# Patient Record
Sex: MALE | Race: BLACK OR AFRICAN AMERICAN | Employment: FULL TIME | URBAN - METROPOLITAN AREA
[De-identification: names, ages, dates, MRNs, and addresses within clinical notes are randomized per-mention and may not be internally consistent; named-entity substitution may affect disease eponyms.]

---

## 2018-02-14 ENCOUNTER — HOSPITAL ENCOUNTER (OUTPATIENT)
Dept: LAB | Age: 38
Discharge: HOME | End: 2018-02-14
Payer: COMMERCIAL

## 2018-02-14 ENCOUNTER — OFFICE VISIT (OUTPATIENT)
Dept: INTERNAL MEDICINE CLINIC | Age: 38
End: 2018-02-14

## 2018-02-14 VITALS
WEIGHT: 257 LBS | HEIGHT: 71 IN | DIASTOLIC BLOOD PRESSURE: 94 MMHG | SYSTOLIC BLOOD PRESSURE: 132 MMHG | HEART RATE: 82 BPM | OXYGEN SATURATION: 97 % | TEMPERATURE: 98.1 F | RESPIRATION RATE: 14 BRPM | BODY MASS INDEX: 35.98 KG/M2

## 2018-02-14 DIAGNOSIS — Z00.00 ROUTINE GENERAL MEDICAL EXAMINATION AT A HEALTH CARE FACILITY: ICD-10-CM

## 2018-02-14 DIAGNOSIS — Z00.00 ROUTINE GENERAL MEDICAL EXAMINATION AT A HEALTH CARE FACILITY: Primary | ICD-10-CM

## 2018-02-14 PROBLEM — E66.9 OBESITY, CLASS II, BMI 35-39.9: Status: ACTIVE | Noted: 2018-02-14

## 2018-02-14 LAB
ALBUMIN SERPL-MCNC: 4.3 G/DL (ref 3.4–5)
ALBUMIN/GLOB SERPL: 1.2 {RATIO} (ref 0.8–1.7)
ALP SERPL-CCNC: 93 U/L (ref 45–117)
ALT SERPL-CCNC: 42 U/L (ref 16–61)
ANION GAP SERPL CALC-SCNC: 9 MMOL/L (ref 3–18)
AST SERPL-CCNC: 21 U/L (ref 15–37)
BASOPHILS # BLD: 0 K/UL (ref 0–0.06)
BASOPHILS NFR BLD: 1 % (ref 0–2)
BILIRUB SERPL-MCNC: 0.3 MG/DL (ref 0.2–1)
BUN SERPL-MCNC: 18 MG/DL (ref 7–18)
BUN/CREAT SERPL: 16 (ref 12–20)
CALCIUM SERPL-MCNC: 8.9 MG/DL (ref 8.5–10.1)
CHLORIDE SERPL-SCNC: 105 MMOL/L (ref 100–108)
CHOLEST SERPL-MCNC: 141 MG/DL
CO2 SERPL-SCNC: 26 MMOL/L (ref 21–32)
CREAT SERPL-MCNC: 1.12 MG/DL (ref 0.6–1.3)
DIFFERENTIAL METHOD BLD: ABNORMAL
EOSINOPHIL # BLD: 0.1 K/UL (ref 0–0.4)
EOSINOPHIL NFR BLD: 2 % (ref 0–5)
ERYTHROCYTE [DISTWIDTH] IN BLOOD BY AUTOMATED COUNT: 12.1 % (ref 11.6–14.5)
EST. AVERAGE GLUCOSE BLD GHB EST-MCNC: NORMAL MG/DL
GLOBULIN SER CALC-MCNC: 3.6 G/DL (ref 2–4)
GLUCOSE SERPL-MCNC: 91 MG/DL (ref 74–99)
HBA1C MFR BLD: 4.8 % (ref 4.2–5.6)
HCT VFR BLD AUTO: 40.9 % (ref 36–48)
HDLC SERPL-MCNC: 39 MG/DL (ref 40–60)
HDLC SERPL: 3.6 {RATIO} (ref 0–5)
HGB BLD-MCNC: 13.3 G/DL (ref 13–16)
LDLC SERPL CALC-MCNC: 86.8 MG/DL (ref 0–100)
LIPID PROFILE,FLP: ABNORMAL
LYMPHOCYTES # BLD: 2.4 K/UL (ref 0.9–3.6)
LYMPHOCYTES NFR BLD: 39 % (ref 21–52)
MCH RBC QN AUTO: 29.3 PG (ref 24–34)
MCHC RBC AUTO-ENTMCNC: 32.5 G/DL (ref 31–37)
MCV RBC AUTO: 90.1 FL (ref 74–97)
MONOCYTES # BLD: 0.5 K/UL (ref 0.05–1.2)
MONOCYTES NFR BLD: 8 % (ref 3–10)
NEUTS SEG # BLD: 3 K/UL (ref 1.8–8)
NEUTS SEG NFR BLD: 50 % (ref 40–73)
PLATELET # BLD AUTO: 305 K/UL (ref 135–420)
PMV BLD AUTO: 10.7 FL (ref 9.2–11.8)
POTASSIUM SERPL-SCNC: 4.1 MMOL/L (ref 3.5–5.5)
PROT SERPL-MCNC: 7.9 G/DL (ref 6.4–8.2)
RBC # BLD AUTO: 4.54 M/UL (ref 4.7–5.5)
SODIUM SERPL-SCNC: 140 MMOL/L (ref 136–145)
TRIGL SERPL-MCNC: 76 MG/DL (ref ?–150)
TSH SERPL DL<=0.05 MIU/L-ACNC: 2.27 UIU/ML (ref 0.36–3.74)
VLDLC SERPL CALC-MCNC: 15.2 MG/DL
WBC # BLD AUTO: 6 K/UL (ref 4.6–13.2)

## 2018-02-14 PROCEDURE — 83036 HEMOGLOBIN GLYCOSYLATED A1C: CPT | Performed by: PHYSICIAN ASSISTANT

## 2018-02-14 PROCEDURE — 80053 COMPREHEN METABOLIC PANEL: CPT | Performed by: PHYSICIAN ASSISTANT

## 2018-02-14 PROCEDURE — 36415 COLL VENOUS BLD VENIPUNCTURE: CPT | Performed by: PHYSICIAN ASSISTANT

## 2018-02-14 PROCEDURE — 85025 COMPLETE CBC W/AUTO DIFF WBC: CPT | Performed by: PHYSICIAN ASSISTANT

## 2018-02-14 PROCEDURE — 84443 ASSAY THYROID STIM HORMONE: CPT | Performed by: PHYSICIAN ASSISTANT

## 2018-02-14 PROCEDURE — 80061 LIPID PANEL: CPT | Performed by: PHYSICIAN ASSISTANT

## 2018-02-14 RX ORDER — NAPROXEN 500 MG/1
500 TABLET ORAL 2 TIMES DAILY WITH MEALS
Qty: 30 TAB | Refills: 0 | Status: SHIPPED | OUTPATIENT
Start: 2018-02-14 | End: 2018-09-26 | Stop reason: SDUPTHER

## 2018-02-14 NOTE — PROGRESS NOTES
1. Have you been to the ER, urgent care clinic or hospitalized since your last visit? NO.     2. Have you seen or consulted any other health care providers outside of the [address] [place] [address] since your last visit (Include any pap smears or colon screening)? NO      Do you have an Advanced Directive? NO    Would you like information on Advanced Directives?  NO

## 2018-02-14 NOTE — MR AVS SNAPSHOT
[address] [address] [address] [place], [address] 
[phone] Patient: Germán Welsh MRN: [id number] FRANCIA:[birth year] Visit Information Date & Time Provider Department Dept. Phone Encounter #  
 2/14/2018  1:30 PM Margo Colleen, 49[facility] [phone] Upcoming Health Maintenance Date Due DTaP/Tdap/Td series (1 - Tdap) 9/30/2003 Influenza Age 5 to Adult 8/1/2017 Allergies as of 2/14/2018  Review Complete On: 2/14/2018 By: Antonia Denis LPN No Known Allergies Current Immunizations  Never Reviewed No immunizations on file. Not reviewed this visit You Were Diagnosed With   
  
 Codes Comments Routine general medical examination at a health care facility    -  Primary ICD-10-CM: Z00.00 ICD-9-CM: V70.0 Vitals BP Pulse Temp Resp Height(growth percentile) Weight(growth percentile) (!) 132/94 (BP 1 Location: Right arm, BP Patient Position: Sitting) 82 98.1 °F (36.7 °C) (Oral) 14 5' 11\" (1.803 m) 257 lb (116.6 kg) SpO2 BMI Smoking Status 97% 35.84 kg/m2 Never Smoker Vitals History BMI and BSA Data Body Mass Index Body Surface Area  
 35.84 kg/m 2 2.42 m 2 Preferred Pharmacy Pharmacy Name Phone [facility] [address] [place] [address] [phone] Your Updated Medication List  
  
   
This list is accurate as of: 2/14/18  1:53 PM.  Always use your most recent med list.  
  
  
  
  
 acetaminophen 500 mg tablet Commonly known as:  TYLENOL Take 1 Tab by mouth every six (6) hours as needed for Pain. cyclobenzaprine 10 mg tablet Commonly known as:  FLEXERIL Take 1 Tab by mouth three (3) times daily as needed for Muscle Spasm(s). lisinopril 10 mg tablet Commonly known as:  [facility] Take 1 Tab by mouth daily. naproxen 500 mg tablet Commonly known as:  NAPROSYN Take 1 Tab by mouth two (2) times daily (with meals). Prescriptions Sent to Pharmacy Refills  
 naproxen (NAPROSYN) 500 mg tablet 0 Sig: Take 1 Tab by mouth two (2) times daily (with meals). Class: Normal  
 Pharmacy: [facility] [address] [place] [address] Holli Deleon [address] [place] [address] #: [phone] Route: Oral  
  
Introducing [facility] & HE[facility]! [employer] introduces [employer] patient portal. Now you can access parts of your medical record, email your doctor's office, and request medication refills online. 1. In your internet browser, go to [email or web address] 2. Click on the First Time User? Click Here link in the Sign In box. You will see the New Member Sign Up page. 3. Enter your [employer] Access Code exactly as it appears below. You will not need to use this code after youve completed the sign-up process. If you do not sign up before the expiration date, you must request a new code. · [employer] Access Code: [email or web address] Expires: 5/15/2018  1:53 PM 
 
4. Enter the last four digits of your Social Security Number (xxxx) and Date of Birth (mm/dd/yyyy) as indicated and click Submit. You will be taken to the next sign-up page. 5. Create a [employer] ID. This will be your [employer] login ID and cannot be changed, so think of one that is secure and easy to remember. 6. Create a [employer] password. You can change your password at any time. 7. Enter your Password Reset Question and Answer. This can be used at a later time if you forget your password. 8. Enter your e-mail address. You will receive e-mail notification when new information is available in [address] 9. Click Sign Up. You can now view and download portions of your medical record. 10. Click the Download Summary menu link to download a portable copy of your medical information. If you have questions, please visit the Frequently Asked Questions section of the [employer] website. Remember, [employer] is NOT to be used for urgent needs. For medical emergencies, dial 911. Now available from your iPhone and Android! Please provide this summary of care documentation to your next provider. Your primary care clinician is listed as Jensen Means. If you have any questions after today's visit, please call [phone].

## 2018-02-14 NOTE — PROGRESS NOTES
HPI/History  Oneyda Desai is a 30 y.o.  male. Former pt of Dr. Jose G Rahman and presents to establish with new provider and for acute issue. Pt c/o pain around cervicothoracic junction and lumbosacral region x 2 days. Mostly midline in both areas. Both sharp and intermittent in nature. Some aching of bilat posterior legs but denies any radiation into arms, paresthesias, weakness, or bowel/urinary incontinence. No limitations in function. Pain tolerable. No known trauma or inciting event but some heavy lifting sporadically at work. No tx tried. No developments. HTN but took himself off lisinopril for unclear reasons. BP elevated today but not as severely as in the past. States he recently starting improving his diet and is starting to exercise. Obese with weight gain since last visit in 2016. Pt denies any other sxs or complaints. No labs on record. Patient Active Problem List   Diagnosis Code    Essential hypertension with goal blood pressure less than 140/90 I10     Past Medical History:   Diagnosis Date    HTN (hypertension)     Obesity      History reviewed. No pertinent surgical history. Social History     Social History    Marital status:      Spouse name: N/A    Number of children: 2    Years of education: N/A     Occupational History    Not on file. 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      No current facility-administered medications for this visit. No Known Allergies    Review of Systems  Aside from those included in HPI, remainder of complete ROS negative. Physical Examination  Visit Vitals    BP (!) 132/94 (BP 1 Location: Right arm, BP Patient Position: Sitting)    Pulse 82    Temp 98.1 °F (36.7 °C) (Oral)    Resp 14    Ht 5' 11\" (1.803 m)    Wt 257 lb (116.6 kg)    SpO2 97%    BMI 35.84 kg/m2     General - Alert and in no acute distress. Pt appears well, comfortable, and in good spirits. Pleasant, engaging. Nontoxic. Not anxious, non-diaphoretic. Mental status - Appropriate mood, behavior, speech content, dress, and thought processes. Head - Normocephalic, atraumatic. Eyes - Pupils equal and reactive, extraocular eye movements intact. No erythema or discharge. Vision intact. Ears - Auditory canals and TMs appear normal. Hearing intact. Nose - Good air movement. No erythema. No rhinorrhea. Mouth - Mucous membranes moist. Pharynx without lesions, swelling, erythema, or exudate. Teeth in good repair. Pulm - No tachypnea, retractions, or cyanosis. Good respiratory effort. Clear to auscultation bilat. No appreciable wheezes, rales, or rhonchi. Cardiovascular - Normal rate, regular rhythm. No appreciable murmurs or gallops. Abdomen - Obese. Nondistended. Active bowel sounds. Soft, nontender. No appreciable organomegaly or masses. /Rectal exams deferred. Extremities - No cyanosis, clubbing, or edema of the extremities. Peripheral pulses intact. Lymph - No periauricular, perimandibular, or cervical tenderness or swelling. Neck/Back - Indicates midline lower cervical/upper thoracic region as affected area as well as midline lumbosacral region. No visible or palpable deformities. No tenderness. Good ROM. No other findings. Neuromuscular - CN 2-12 intact. Full and symmetric strength with good ROM. Light touch sensation intact. No other focal findings or movement disorder.    Skin - Tattoos noted.    Assessment and Plan  1. Upper and lower back pain with some signs of sciatic involvement - Present for 2 days. No alarm sxs. Will treat with naprosyn with food and other conservative measures as discussed at length. Discussed course and prognosis. May consider PT if no improvement. 2. HTN - Pt does not want to restart medications at this time. Discussed TLC at length. Will give some time and revisit at next visit. 3. Obesity - TLC discussed along with relation to above. Pt reports recently changing lifestyle and wishes to continue efforts. He refuses dietician/nutritionist at this time but will call if changes his mind. Will follow. No labs on record. NON-fasting labs today (CBC, CMP, lipids, A1c, and TSH). F/u determination and further planning as warranted. Pt happily agrees with plan. PLEASE NOTE:   This document has been produced using voice recognition software. Unrecognized errors in transcription may be present.     Adriana King [employer] [facility]  [phone]  2/14/2018

## 2018-02-14 NOTE — LETTER
NOTIFICATION RETURN TO WORK / SCHOOL 
 
2/14/2018 1:49 PM 
 
Mr. Corby Espinoza [address] [facility] [address] To Whom It May Concern: 
 
Corby Espinoza is currently under the care of Roddy Khan. He will return to work/school on: 2/19/18 If there are questions or concerns please have the patient contact our office. Sincerely, KAYA Aguilar

## 2018-02-15 ENCOUNTER — TELEPHONE (OUTPATIENT)
Dept: INTERNAL MEDICINE CLINIC | Age: 38
End: 2018-02-15

## 2018-09-26 ENCOUNTER — OFFICE VISIT (OUTPATIENT)
Dept: INTERNAL MEDICINE CLINIC | Age: 38
End: 2018-09-26

## 2018-09-26 VITALS
SYSTOLIC BLOOD PRESSURE: 138 MMHG | OXYGEN SATURATION: 97 % | BODY MASS INDEX: 34.02 KG/M2 | TEMPERATURE: 97.8 F | WEIGHT: 243 LBS | HEART RATE: 72 BPM | RESPIRATION RATE: 14 BRPM | HEIGHT: 71 IN | DIASTOLIC BLOOD PRESSURE: 88 MMHG

## 2018-09-26 DIAGNOSIS — E66.9 OBESITY, CLASS II, BMI 35-39.9: ICD-10-CM

## 2018-09-26 DIAGNOSIS — M54.50 ACUTE BILATERAL LOW BACK PAIN WITHOUT SCIATICA: Primary | ICD-10-CM

## 2018-09-26 DIAGNOSIS — I10 ESSENTIAL HYPERTENSION WITH GOAL BLOOD PRESSURE LESS THAN 140/90: ICD-10-CM

## 2018-09-26 RX ORDER — NAPROXEN 500 MG/1
500 TABLET ORAL 2 TIMES DAILY WITH MEALS
Qty: 30 TAB | Refills: 0 | Status: SHIPPED | OUTPATIENT
Start: 2018-09-26 | End: 2020-06-29

## 2018-09-26 NOTE — LETTER
NOTIFICATION RETURN TO WORK / SCHOOL 
 
9/26/2018 2:52 PM 
 
Mr. Funmi Cortez [address] [place] [id number] To Whom It May Concern: 
 
Funmi Cortez is currently under the care of Roddy Khan. He will return to work/school on: 10/1/18 If there are questions or concerns please have the patient contact our office. Sincerely, KAYA Aragon 
 
                                
 

no

## 2018-09-26 NOTE — PROGRESS NOTES
1. Have you been to the ER, urgent care clinic or hospitalized since your last visit? NO.  
 
2. Have you seen or consulted any other health care providers outside of the [facility] since your last visit (Include any pap smears or colon screening)? NO Do you have an Advanced Directive? NO Would you like information on Advanced Directives?  NO

## 2018-09-26 NOTE — MR AVS SNAPSHOT
[address] 
[phone] Patient: Janet Chaudhari MRN: [id number] HJI:[birth year] Visit Information Date & Time Provider Department Dept. Phone Encounter #  
 9/26/2018  2:30 PM Zain Cummins [facility] [address] [place] [address] Road [phone] Upcoming Health Maintenance Date Due DTaP/Tdap/Td series (1 - Tdap) 9/30/2003 Influenza Age 5 to Adult 8/1/2018 Allergies as of 9/26/2018  Review Complete On: 9/26/2018 By: Enrike Arteaga LPN No Known Allergies Current Immunizations  Never Reviewed No immunizations on file. Not reviewed this visit You Were Diagnosed With   
  
 Codes Comments Acute bilateral low back pain without sciatica    -  Primary ICD-10-CM: M54.5 ICD-9-CM: 724.2, 338.19 Vitals BP Pulse Temp Resp Height(growth percentile) Weight(growth percentile) 138/88 (BP 1 Location: Right arm, BP Patient Position: Sitting) 72 97.8 °F (36.6 °C) (Oral) 14 5' 11\" (1.803 m) 243 lb (110.2 kg) SpO2 BMI Smoking Status 97% 33.89 kg/m2 Never Smoker Vitals History BMI and BSA Data Body Mass Index Body Surface Area  
 33.89 kg/m 2 2.35 m 2 Preferred Pharmacy Pharmacy Name Phone [facility] [address] [place] [phone] Your Updated Medication List  
  
   
This list is accurate as of 9/26/18  2:55 PM.  Always use your most recent med list.  
  
  
  
  
 naproxen 500 mg tablet Commonly known as:  NAPROSYN Take 1 Tab by mouth two (2) times daily (with meals). Introducing [facility] & HEALTH SERVICES! Donnell Serrano introduces [employer] patient portal. Now you can access parts of your medical record, email your doctor's office, and request medication refills online. 1. In your internet browser, go to [email or web address] 2. Click on the First Time User? Click Here link in the Sign In box. You will see the New Member Sign Up page. 3. Enter your [employer] Access Code exactly as it appears below. You will not need to use this code after youve completed the sign-up process. If you do not sign up before the expiration date, you must request a new code. · [employer] Access Code: [email or web address] Expires: 12/25/2018  2:55 PM 
 
4. Enter the last four digits of your Social Security Number (xxxx) and Date of Birth (mm/dd/yyyy) as indicated and click Submit. You will be taken to the next sign-up page. 5. Create a [employer] ID. This will be your [employer] login ID and cannot be changed, so think of one that is secure and easy to remember. 6. Create a [employer] password. You can change your password at any time. 7. Enter your Password Reset Question and Answer. This can be used at a later time if you forget your password. 8. Enter your e-mail address. You will receive e-mail notification when new information is available in [address] 9. Click Sign Up. You can now view and download portions of your medical record. 10. Click the Download Summary menu link to download a portable copy of your medical information. If you have questions, please visit the Frequently Asked Questions section of the [employer] website. Remember, [employer] is NOT to be used for urgent needs. For medical emergencies, dial 911. Now available from your iPhone and Android! Please provide this summary of care documentation to your next provider. Your primary care clinician is listed as Aydee Harmon. If you have any questions after today's visit, please call [phone].

## 2018-09-26 NOTE — PROGRESS NOTES
HPI/History Princess Elam is a 30 y.o.  male who presents for evaluation. Pt was to return in May for f/u but never scheduled. Pt reports bilat lumbar back pain after heavy lifting at work on 9/24. Aching and sometimes sharp with certain movements. No radiation into legs or elsewhere and no paresthesias or incontinence. Naproxen has helped. Has not tried anything else. Hx of HTN and, at last visit, pt had taken himself off of lisinopril for unclear reasons. BP was borderline at the time and refused to resume meds. Vowed to work on diet, exercise, and weight loss. He is down 14lbs since that time and BP with some improvement. He vows to continue his efforts. Patient Active Problem List  
Diagnosis Code  Essential hypertension with goal blood pressure less than 140/90 I10  Obesity, Class II, BMI 35-39.9 E66.9 Past Medical History:  
Diagnosis Date  
 HTN (hypertension)  Obesity History reviewed. No pertinent surgical history. Social History Social History  Marital status:  Spouse name: N/A  
 Number of children: 2  
 Years of education: N/A Occupational History  Not on file. Social History Main Topics  Smoking status: Never Smoker  Smokeless tobacco: Never Used  Alcohol use Yes Comment: weekends: beer-2 beers/weekend  Drug use: No  
 Sexual activity: Yes  
  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  
 naproxen (NAPROSYN) 500 mg tablet Take 1 Tab by mouth two (2) times daily (with meals). No current facility-administered medications for this visit. No Known Allergies Review of Systems Aside from those included in HPI, remainder of ROS negative. Physical Examination Visit Vitals  /88 (BP 1 Location: Right arm, BP Patient Position: Sitting)  Pulse 72  Temp 97.8 °F (36.6 °C) (Oral)  Resp 14  
 Ht 5' 11\" (1.803 m)  Wt 243 lb (110.2 kg)  SpO2 97%  BMI 33.89 kg/m2 General - Alert and in no acute distress. Pt appears well, comfortable, and in good spirits. Pleasant, engaging. Nontoxic. Not anxious, non-diaphoretic. Mental status - Appropriate mood, behavior, speech content, dress, and thought processes. Pulm - No tachypnea, retractions, or cyanosis. Good respiratory effort. Cardiovascular - Normal rate. Back - no visible deformities/abnormalities. Some paraspinal muscle contraction and mild tenderness. No palpable deformities or midline findings. Good ROM with no apparent discomfort. Ambulates well. No other findings. Assessment and Plan 1. Low back pain - Musculoskeletal after heavy lifting. No radicular or other concerning sxs. Refilled naprosyn to take with food. Revisited other conservative measures at length along with course and prognosis. Work note provided. 2. HTN - Not currently on medications and BP has improved with lifestyle. Pt vows to continue his efforts. Will follow. 3. Obesity - Down 14lbs since February with self-guided measures. Encouraged and will follow. Plan for CPE with preceding labs in 2/2019. Return/call sooner if needed. Further planning as warranted. Pt happily agrees with plan. PLEASE NOTE:  
This document has been produced using voice recognition software. Unrecognized errors in transcription may be present. Naif Pop PA-C [facility] 
[phone] 
9/26/2018

## 2020-06-04 ENCOUNTER — VIRTUAL VISIT (OUTPATIENT)
Dept: INTERNAL MEDICINE CLINIC | Age: 40
End: 2020-06-04

## 2020-06-04 DIAGNOSIS — R60.9 EDEMA, UNSPECIFIED TYPE: Primary | ICD-10-CM

## 2020-06-04 DIAGNOSIS — R36.9 PENILE DISCHARGE: ICD-10-CM

## 2020-06-04 DIAGNOSIS — Z11.3 SCREEN FOR STD (SEXUALLY TRANSMITTED DISEASE): ICD-10-CM

## 2020-06-04 DIAGNOSIS — I10 ESSENTIAL HYPERTENSION WITH GOAL BLOOD PRESSURE LESS THAN 140/90: ICD-10-CM

## 2020-06-04 NOTE — PROGRESS NOTES
Alcon Weiss is a 42 y.o. male who was seen by synchronous (real-time) audio-video technology on 6/4/2020. Assessment & Plan:   1. Edema and H/o HTN:   The patient agreed to get a blood pressure machine. I instructed him to check his blood pressure once a day. I will follow-up with him in 3 to 4 weeks to assess his BP readings.  Checking labs.  I instructed him to elevate his legs. ORDERS:  - MICROALBUMIN, UR, RAND W/ MICROALB/CREAT RATIO; Future  - METABOLIC PANEL, COMPREHENSIVE; Future  - D DIMER; Future  - URINALYSIS W/ RFLX MICROSCOPIC; Future  - CBC WITH AUTOMATED DIFF; Future    2. Penile discharge: He wants to be screened for STDs. [facility] labs. ORDERS:  - METABOLIC PANEL, COMPREHENSIVE; Future  - HEMOGLOBIN A1C W/O EAG; Future  - T PALLIDUM AB, BY FTA-ABS; Future  - HIV 1/2 AG/AB, 4TH GENERATION,W RFLX CONFIRM; Future  - HEP B SURFACE AG; Future  - CHLAMYDIA/NEISSERIA AMPLIFICATION; Future  - CBC WITH AUTOMATED DIFF; Future  - HEPATITIS C AB; Future      Lab review: labs are reviewed in the EHR and ordered as mentioned above. I have discussed the diagnosis with the patient and the intended plan as seen in the above orders. I have discussed medication side effects and warnings with the patient as well. I have reviewed the plan of care with the patient, accepted their input and they are in agreement with the treatment goals. All questions were answered. The patient understands the plan of care. Pt instructed if symptoms worsen to call the office or report to the ED for continued care. Greater than 50% of the visit time was spent in counseling and/or coordination of care. I spent the rest of the patient's visit encounter, most of which was spent with him via phone due to technology issues with the [employer]. me website freezing. Voice recognition was used to generate this report, which may have resulted in some phonetic based errors in grammar and contents.  Even though attempts were made to correct all the mistakes, some may have been missed, and remained in the body of the document. Subjective:   Drake Medrano was seen for   Chief Complaint   Patient presents with    Peripheral Edema     The pt is a 28-year-old male with history of hypertension and obesity. Edema and HTN: In the past, he was treated with lisinopril for elevated BP. His weight was estimated to be in the 240s range. Today he reports: A 1 year history of peripheral edema in his legs. No alleviating factors are known. No triggers are known. Although he used to be on medication, he is not taking any medicine. He stopped his lisinopril. He has not had a blood pressure machine and has not had his blood pressure checked. 2. Penile D/c: Present for >2 months. He has clear to yellow d/c. No pain or rash. No fever. He is going through a divorce. He states that his wife was unfaithful to him. He is not sexually active. Prior to Admission medications    Medication Sig Start Date End Date Taking? Authorizing Provider   naproxen (NAPROSYN) 500 mg tablet Take 1 Tab by mouth two (2) times daily (with meals). 9/26/18   Isidoro Orona PA     No Known Allergies  Past Medical History:   Diagnosis Date    HTN (hypertension)     Obesity      No past surgical history on file.   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 No HA  CV: No CP  Resp: No cough/SOB  GI: No abdominal pain  : No hematuria/dysuria  Derm: No rash  Neuro: No new paresthesias/weakness  Musc: No new myalgias/jt pain  Psych: No depression sx      Objective:     General: alert, cooperative, no distress   Mental  status: mental status: alert, oriented to person, place, and time, normal mood, behavior, speech, dress, motor activity, and thought processes   Resp: resp: normal effort and no respiratory distress   Neuro: neuro: no gross deficits   Skin: skin: no discoloration or lesions of concern on visible areas   +1 edema in BLE    LABS:  Lab Results   Component Value Date/Time    Sodium 140 02/14/2018 01:50 PM    Potassium 4.1 02/14/2018 01:50 PM    Chloride 105 02/14/2018 01:50 PM    CO2 26 02/14/2018 01:50 PM    Anion gap 9 02/14/2018 01:50 PM    Glucose 91 02/14/2018 01:50 PM    BUN 18 02/14/2018 01:50 PM    Creatinine 1.12 02/14/2018 01:50 PM    BUN/Creatinine ratio 16 02/14/2018 01:50 PM    GFR est AA >60 02/14/2018 01:50 PM    GFR est non-AA >60 02/14/2018 01:50 PM    Calcium 8.9 02/14/2018 01:50 PM       Lab Results   Component Value Date/Time    Cholesterol, total 141 02/14/2018 01:50 PM    HDL Cholesterol 39 (L) 02/14/2018 01:50 PM    LDL, calculated 86.8 02/14/2018 01:50 PM    VLDL, calculated 15.2 02/14/2018 01:50 PM    Triglyceride 76 02/14/2018 01:50 PM    CHOL/HDL Ratio 3.6 02/14/2018 01:50 PM       Lab Results   Component Value Date/Time    WBC 6.0 02/14/2018 01:50 PM    HGB 13.3 02/14/2018 01:50 PM    HCT 40.9 02/14/2018 01:50 PM    PLATELET 579 94/54/3334 01:50 PM    MCV 90.1 02/14/2018 01:50 PM       Lab Results   Component Value Date/Time    Hemoglobin A1c 4.8 02/14/2018 01:50 PM       Lab Results   Component Value Date/Time    TSH 2.27 02/14/2018 01:50 PM           Due to this being a TeleHealth  evaluation, many elements of the physical examination are unable to be assessed.      The pt was seen by synchronous (real-time) audio-video technology, and/or her healthcare decision maker, is aware that this patient-initiated, Telehealth encounter is a billable service, with coverage as determined by her insurance carrier. She is aware that she may receive a bill and has provided verbal consent to proceed: Yes. The pt is being evaluated by a video visit encounter for concerns as above. A caregiver was present when appropriate. Due to this being a TeleHealth encounter (During AXXNO-39 public health emergency), evaluation of the following organ systems was limited: Vitals/Constitutional/EENT/Resp/CV/GI//MS/Neuro/Skin/Heme-Lymph-Imm. Pursuant to the emergency declaration under the [facility], [facility] waiver authority and the [employer] Act, this Virtual  Visit was conducted, with patient's (and/or legal guardian's) consent, to reduce the patient's risk of exposure to COVID-19 and provide necessary medical care. Services were provided through a video synchronous discussion virtually to substitute for in-person clinic visit. Patient and provider were located at their individual homes. We discussed the expected course, resolution and complications of the diagnosis(es) in detail. Medication risks, benefits, costs, interactions, and alternatives were discussed as indicated. I advised him to contact the office if his condition worsens, changes or fails to improve as anticipated. He expressed understanding with the diagnosis(es) and plan.      Teena Oakes MD

## 2020-06-15 ENCOUNTER — HOSPITAL ENCOUNTER (OUTPATIENT)
Dept: LAB | Age: 40
Discharge: HOME | End: 2020-06-15
Payer: COMMERCIAL

## 2020-06-15 ENCOUNTER — APPOINTMENT (OUTPATIENT)
Dept: INTERNAL MEDICINE CLINIC | Age: 40
End: 2020-06-15

## 2020-06-15 DIAGNOSIS — Z11.3 SCREEN FOR STD (SEXUALLY TRANSMITTED DISEASE): ICD-10-CM

## 2020-06-15 DIAGNOSIS — I10 ESSENTIAL HYPERTENSION WITH GOAL BLOOD PRESSURE LESS THAN 140/90: ICD-10-CM

## 2020-06-15 DIAGNOSIS — R60.9 EDEMA, UNSPECIFIED TYPE: ICD-10-CM

## 2020-06-15 DIAGNOSIS — R36.9 PENILE DISCHARGE: ICD-10-CM

## 2020-06-15 LAB
ALBUMIN SERPL-MCNC: 3.9 G/DL (ref 3.4–5)
ALBUMIN/GLOB SERPL: 1.3 {RATIO} (ref 0.8–1.7)
ALP SERPL-CCNC: 81 U/L (ref 45–117)
ALT SERPL-CCNC: 42 U/L (ref 16–61)
ANION GAP SERPL CALC-SCNC: 3 MMOL/L (ref 3–18)
APPEARANCE UR: CLEAR
AST SERPL-CCNC: 18 U/L (ref 10–38)
BASOPHILS # BLD: 0 K/UL (ref 0–0.1)
BASOPHILS NFR BLD: 0 % (ref 0–2)
BILIRUB SERPL-MCNC: 0.4 MG/DL (ref 0.2–1)
BILIRUB UR QL: NEGATIVE
BUN SERPL-MCNC: 16 MG/DL (ref 7–18)
BUN/CREAT SERPL: 12 (ref 12–20)
CALCIUM SERPL-MCNC: 8.8 MG/DL (ref 8.5–10.1)
CHLORIDE SERPL-SCNC: 109 MMOL/L (ref 100–111)
CO2 SERPL-SCNC: 31 MMOL/L (ref 21–32)
COLOR UR: YELLOW
CREAT SERPL-MCNC: 1.31 MG/DL (ref 0.6–1.3)
CREAT UR-MCNC: 105 MG/DL (ref 30–125)
D DIMER PPP FEU-MCNC: <0.27 UG/ML(FEU)
DIFFERENTIAL METHOD BLD: ABNORMAL
EOSINOPHIL # BLD: 0.2 K/UL (ref 0–0.4)
EOSINOPHIL NFR BLD: 3 % (ref 0–5)
ERYTHROCYTE [DISTWIDTH] IN BLOOD BY AUTOMATED COUNT: 12.5 % (ref 11.6–14.5)
GLOBULIN SER CALC-MCNC: 3 G/DL (ref 2–4)
GLUCOSE SERPL-MCNC: 86 MG/DL (ref 74–99)
GLUCOSE UR STRIP.AUTO-MCNC: NEGATIVE MG/DL
HBA1C MFR BLD: 4.6 % (ref 4.2–5.6)
HCT VFR BLD AUTO: 41.8 % (ref 36–48)
HGB BLD-MCNC: 13.1 G/DL (ref 13–16)
HGB UR QL STRIP: NEGATIVE
KETONES UR QL STRIP.AUTO: NEGATIVE MG/DL
LEUKOCYTE ESTERASE UR QL STRIP.AUTO: NEGATIVE
LYMPHOCYTES # BLD: 2.7 K/UL (ref 0.9–3.6)
LYMPHOCYTES NFR BLD: 43 % (ref 21–52)
MCH RBC QN AUTO: 29.5 PG (ref 24–34)
MCHC RBC AUTO-ENTMCNC: 31.3 G/DL (ref 31–37)
MCV RBC AUTO: 94.1 FL (ref 74–97)
MICROALBUMIN UR-MCNC: <0.5 MG/DL (ref 0–3)
MICROALBUMIN/CREAT UR-RTO: NORMAL MG/G (ref 0–30)
MONOCYTES # BLD: 0.6 K/UL (ref 0.05–1.2)
MONOCYTES NFR BLD: 9 % (ref 3–10)
NEUTS SEG # BLD: 2.7 K/UL (ref 1.8–8)
NEUTS SEG NFR BLD: 45 % (ref 40–73)
NITRITE UR QL STRIP.AUTO: NEGATIVE
PH UR STRIP: 7 [PH] (ref 5–8)
PLATELET # BLD AUTO: 297 K/UL (ref 135–420)
PMV BLD AUTO: 10.9 FL (ref 9.2–11.8)
POTASSIUM SERPL-SCNC: 4 MMOL/L (ref 3.5–5.5)
PROT SERPL-MCNC: 6.9 G/DL (ref 6.4–8.2)
PROT UR STRIP-MCNC: NEGATIVE MG/DL
RBC # BLD AUTO: 4.44 M/UL (ref 4.7–5.5)
SODIUM SERPL-SCNC: 143 MMOL/L (ref 136–145)
SP GR UR REFRACTOMETRY: 1.02 (ref 1–1.03)
UROBILINOGEN UR QL STRIP.AUTO: 0.2 EU/DL (ref 0.2–1)
WBC # BLD AUTO: 6.3 K/UL (ref 4.6–13.2)

## 2020-06-15 PROCEDURE — 80053 COMPREHEN METABOLIC PANEL: CPT

## 2020-06-15 PROCEDURE — 81003 URINALYSIS AUTO W/O SCOPE: CPT

## 2020-06-15 PROCEDURE — 83036 HEMOGLOBIN GLYCOSYLATED A1C: CPT

## 2020-06-15 PROCEDURE — 87389 HIV-1 AG W/HIV-1&-2 AB AG IA: CPT

## 2020-06-15 PROCEDURE — 85025 COMPLETE CBC W/AUTO DIFF WBC: CPT

## 2020-06-15 PROCEDURE — 85379 FIBRIN DEGRADATION QUANT: CPT

## 2020-06-15 PROCEDURE — 82043 UR ALBUMIN QUANTITATIVE: CPT

## 2020-06-15 PROCEDURE — 86803 HEPATITIS C AB TEST: CPT

## 2020-06-15 PROCEDURE — 86780 TREPONEMA PALLIDUM: CPT

## 2020-06-15 PROCEDURE — 36415 COLL VENOUS BLD VENIPUNCTURE: CPT

## 2020-06-15 PROCEDURE — 87340 HEPATITIS B SURFACE AG IA: CPT

## 2020-06-16 LAB
HBV SURFACE AG SER QL: <0.1 INDEX
HBV SURFACE AG SER QL: NEGATIVE
HCV AB SER IA-ACNC: 0.12 INDEX
HCV AB SERPL QL IA: NEGATIVE
HCV COMMENT,HCGAC: NORMAL

## 2020-06-16 NOTE — PROGRESS NOTES
I will let him know at his upcoming appointment that his creatinine is mildly elevated at 1.31. BUN is 16. There is no microalbuminuria. Liver function labs are normal.  His A1c is normal at 4.6. His d-dimer is undetectable. His urinalysis is unremarkable. His CBC is unremarkable.     Dr. Santos Helms  [facility] [address], [address] [place] [address] Str.  Phone: [phone]  Fax: [phone]

## 2020-06-17 LAB
HIV 1+2 AB+HIV1 P24 AG SERPL QL IA: NONREACTIVE
HIV12 RESULT COMMENT, HHIVC: NORMAL
T PALLIDUM AB SER QL IF: NON REACTIVE

## 2020-06-26 DIAGNOSIS — N17.9 AKI (ACUTE KIDNEY INJURY) (HCC): ICD-10-CM

## 2020-06-26 DIAGNOSIS — R36.9 PENILE DISCHARGE: Primary | ICD-10-CM

## 2020-06-29 ENCOUNTER — VIRTUAL VISIT (OUTPATIENT)
Dept: INTERNAL MEDICINE CLINIC | Age: 40
End: 2020-06-29

## 2020-06-29 DIAGNOSIS — R36.9 PENILE DISCHARGE: ICD-10-CM

## 2020-06-29 DIAGNOSIS — H10.9 CONJUNCTIVITIS OF BOTH EYES, UNSPECIFIED CONJUNCTIVITIS TYPE: ICD-10-CM

## 2020-06-29 DIAGNOSIS — I10 ESSENTIAL HYPERTENSION WITH GOAL BLOOD PRESSURE LESS THAN 140/90: Primary | ICD-10-CM

## 2020-06-29 RX ORDER — LISINOPRIL 20 MG/1
20 TABLET ORAL DAILY
Qty: 30 TAB | Refills: 11 | Status: SHIPPED | OUTPATIENT
Start: 2020-06-29

## 2020-06-29 NOTE — PROGRESS NOTES
Jeny Sigala is a 42 y.o. male who was seen by synchronous (real-time) audio-video technology on 6/26/2020.          Assessment & Plan:   1. HTN: Creatinine is a little up. Edema resolved/improved. - F/u with him about home BP readings. I encouraged him to get a BP machine. He agreed to get one.  I will see if we can check his blood pressure in our office in a week. - Ordering lisinopril 20mg daily  Checking a CMP, urine protein screen, A1c, and a lipid panel in a year. I encouraged him to exercise regularly. I will check his weight in a year. 2. Penile D/c: Resolved. - Gonorrhea/chlamydia testing ordered. 3. Conjunctivitis: Asymptomatic per patient history.  - Observation.      Lab review: labs are reviewed in the EHR and ordered as mentioned above.     I have discussed the diagnosis with the patient and the intended plan as seen in the above orders. I have discussed medication side effects and warnings with the patient as well. I have reviewed the plan of care with the patient, accepted their input and they are in agreement with the treatment goals. All questions were answered. The patient understands the plan of care. Pt instructed if symptoms worsen to call the office or report to the ED for continued care.  Greater than 50% of the visit time was spent in counseling and/or coordination of care. I spent 16 minutes with the patient for this encounter, but the majority of the visit was done via phone due to Internet connection issues.       Voice recognition was used to generate this report, which may have resulted in some phonetic based errors in grammar and contents. Even though attempts were made to correct all the mistakes, some may have been missed, and remained in the body of the document.        Subjective:   Jeny Sigala was seen for follow up.     The pt is a 44-year-old male with history of hypertension and obesity.     1. Edema and HTN:  At his last apt, he reported BLE edema x 1 yr.  No alleviating factors or triggers were known. He has a h/o HTN and used to take lisinopril but he stopped taking it on his own. I encouraged him to check his BP at his last apt, by purchasing a machine. Since then, he reports: not checking his BP; he never got a BP machine. Labs were ordered at the time of his last apt. His creatinine was mildly elevated at 1.31. BUN was 16. No microalbuminuria. Liver function labs were normal. His A1C was normal. His d dimer was normal along with his CBC and UA. Since his last apt, the edema has improved. His SBP was in the 170s when checked at an urgent care in between apts 2/2 #3. He is exercising regularly since his last appointment.     2. Penile D/c: He reported a >2 month h/o intermittent penile d/c (clear-yellow). No pain or rash sx were associated with his d/c. He reported that he was going through a divorce after his wife cheated on him. His UA was normal and he screened negative for hepatitis, HIV, and syphilis. For some reason, he was not checked for gonorrhea/chlamydia. Per patient, all symptoms resolved in between appointments.     3. Conjunctivitis: Diagnosed in between apts at an urgent care facility. Sx resolved 1.5 days after being seen at an urgent care within the past week, per his history.                  Prior to Admission medications    Medication Sig Start Date End Date Taking? Authorizing Provider   naproxen (NAPROSYN) 500 mg tablet Take 1 Tab by mouth two (2) times daily (with meals). 9/26/18     Donald Orona PA      No Known Allergies       Past Medical History:   Diagnosis Date    HTN (hypertension)      Obesity        No past surgical history on file.         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 No HA  CV: No CP  Resp: No cough/SOB  GI: No abdominal pain  : No hematuria/dysuria  Derm: No rash  Neuro: No new paresthesias/weakness  Musc: No new myalgias/jt pain        Objective:      General: alert, cooperative, no distress   Mental  status: mental status: alert, oriented to person, place, and time, normal mood, behavior, speech, dress, motor activity, and thought processes   Resp: resp: normal effort and no respiratory distress   Neuro: neuro: no gross deficits   Skin: skin: no discoloration or lesions of concern on visible areas    Eyes: Allergic shiners. White sclera. He has some clear drainage along bilateral eyes.     LABS:        Lab Results   Component Value Date/Time     Sodium 143 06/15/2020 08:03 AM     Potassium 4.0 06/15/2020 08:03 AM     Chloride 109 06/15/2020 08:03 AM     CO2 31 06/15/2020 08:03 AM     Anion gap 3 06/15/2020 08:03 AM     Glucose 86 06/15/2020 08:03 AM     BUN 16 06/15/2020 08:03 AM     Creatinine 1.31 (H) 06/15/2020 08:03 AM     BUN/Creatinine ratio 12 06/15/2020 08:03 AM     GFR est AA >60 06/15/2020 08:03 AM     GFR est non-AA >60 06/15/2020 08:03 AM     Calcium 8.8 06/15/2020 08:03 AM               Lab Results   Component Value Date/Time     Cholesterol, total 141 02/14/2018 01:50 PM     HDL Cholesterol 39 (L) 02/14/2018 01:50 PM     LDL, calculated 86.8 02/14/2018 01:50 PM     VLDL, calculated 15.2 02/14/2018 01:50 PM     Triglyceride 76 02/14/2018 01:50 PM     CHOL/HDL Ratio 3.6 02/14/2018 01:50 PM               Lab Results   Component Value Date/Time     WBC 6.3 06/15/2020 08:03 AM     HGB 13.1 06/15/2020 08:03 AM     HCT 41.8 06/15/2020 08:03 AM     PLATELET 315 53/37/5518 08:03 AM     MCV 94.1 06/15/2020 08:03 AM               Lab Results   Component Value Date/Time     Hemoglobin A1c 4.6 06/15/2020 08:03 AM               Lab Results   Component Value Date/Time     TSH 2.27 02/14/2018 01:50 PM               Due to this being a TeleHealth  evaluation, many elements of the physical examination are unable to be assessed.      The pt was seen by synchronous (real-time) audio-video technology, and/or her healthcare decision maker, is aware that this patient-initiated, Telehealth encounter is a billable service, with coverage as determined by her insurance carrier. She is aware that she may receive a bill and has provided verbal consent to proceed: Yes.      The pt is being evaluated by a video visit encounter for concerns as above. A caregiver was present when appropriate. Due to this being a TeleHealth encounter (During AJARK-36 public health emergency), evaluation of the following organ systems was limited: Vitals/Constitutional/EENT/Resp/CV/GI//MS/Neuro/Skin/Heme-Lymph-Imm. Pursuant to the emergency declaration under the [facility], [facility]5 waiver authority and the [employer] General Act, this Virtual  Visit was conducted, with patient's (and/or legal guardian's) consent, to reduce the patient's risk of exposure to COVID-19 and provide necessary medical care.      Services were provided through a video synchronous discussion virtually to substitute for in-person clinic visit. Patient and provider were located at their individual homes.        We discussed the expected course, resolution and complications of the diagnosis(es) in detail. Medication risks, benefits, costs, interactions, and alternatives were discussed as indicated.   I advised him to contact the office if his condition worsens, changes or fails to improve as anticipated.  He expressed understanding with the diagnosis(es) and plan.      Sai Allen MD

## 2020-07-17 ENCOUNTER — TELEPHONE (OUTPATIENT)
Dept: INTERNAL MEDICINE CLINIC | Age: 40
End: 2020-07-17

## 2020-07-17 NOTE — TELEPHONE ENCOUNTER
Please schedule him for a follow up apt with me in 4 weeks (30 min) to check on his home BPs. Have him get a home BP machine and check his BPs daily for me to review with him. Also schedule him for a lab visit. He had all tests done except a urine test for gonorrhea and chlamydia. This test has been ordered for him to have done.     Dr. Laura Muir  [facility] [address]  Phone: [phone]  Fax: [phone]

## 2020-07-17 NOTE — TELEPHONE ENCOUNTER
Pt is calling stating he got a message to call the office back. He doesn't know who called nor why they called. I don't see any messages in his chart or in [place]. When I told him that, he said well at his last appt, he didn't get everything done he was supposed to get done.   Does anyone know what he is talking about????    Please advise him at [phone]

## 2020-08-03 ENCOUNTER — HOSPITAL ENCOUNTER (OUTPATIENT)
Dept: LAB | Age: 40
Discharge: HOME | End: 2020-08-03
Payer: COMMERCIAL

## 2020-08-03 ENCOUNTER — APPOINTMENT (OUTPATIENT)
Dept: INTERNAL MEDICINE CLINIC | Age: 40
End: 2020-08-03

## 2020-08-03 DIAGNOSIS — R36.9 PENILE DISCHARGE: ICD-10-CM

## 2020-08-03 DIAGNOSIS — I10 ESSENTIAL HYPERTENSION WITH GOAL BLOOD PRESSURE LESS THAN 140/90: ICD-10-CM

## 2020-08-03 DIAGNOSIS — Z11.3 SCREEN FOR STD (SEXUALLY TRANSMITTED DISEASE): ICD-10-CM

## 2020-08-03 DIAGNOSIS — N17.9 AKI (ACUTE KIDNEY INJURY) (HCC): ICD-10-CM

## 2020-08-03 LAB
ALBUMIN SERPL-MCNC: 3.9 G/DL (ref 3.4–5)
ALBUMIN/GLOB SERPL: 1.2 {RATIO} (ref 0.8–1.7)
ALP SERPL-CCNC: 73 U/L (ref 45–117)
ALT SERPL-CCNC: 36 U/L (ref 16–61)
ANION GAP SERPL CALC-SCNC: 3 MMOL/L (ref 3–18)
AST SERPL-CCNC: 22 U/L (ref 10–38)
BILIRUB SERPL-MCNC: 1 MG/DL (ref 0.2–1)
BUN SERPL-MCNC: 19 MG/DL (ref 7–18)
BUN/CREAT SERPL: 15 (ref 12–20)
CALCIUM SERPL-MCNC: 9 MG/DL (ref 8.5–10.1)
CHLORIDE SERPL-SCNC: 109 MMOL/L (ref 100–111)
CHOLEST SERPL-MCNC: 127 MG/DL
CO2 SERPL-SCNC: 31 MMOL/L (ref 21–32)
CREAT SERPL-MCNC: 1.24 MG/DL (ref 0.6–1.3)
CREAT UR-MCNC: 162 MG/DL (ref 30–125)
GLOBULIN SER CALC-MCNC: 3.2 G/DL (ref 2–4)
GLUCOSE SERPL-MCNC: 84 MG/DL (ref 74–99)
HDLC SERPL-MCNC: 50 MG/DL (ref 40–60)
HDLC SERPL: 2.5 {RATIO} (ref 0–5)
LDLC SERPL CALC-MCNC: 69.4 MG/DL (ref 0–100)
LIPID PROFILE,FLP: NORMAL
MICROALBUMIN UR-MCNC: 0.63 MG/DL (ref 0–3)
MICROALBUMIN/CREAT UR-RTO: 4 MG/G (ref 0–30)
POTASSIUM SERPL-SCNC: 4.5 MMOL/L (ref 3.5–5.5)
PROT SERPL-MCNC: 7.1 G/DL (ref 6.4–8.2)
SODIUM SERPL-SCNC: 143 MMOL/L (ref 136–145)
TRIGL SERPL-MCNC: 38 MG/DL (ref ?–150)
VLDLC SERPL CALC-MCNC: 7.6 MG/DL

## 2020-08-03 PROCEDURE — 80053 COMPREHEN METABOLIC PANEL: CPT

## 2020-08-03 PROCEDURE — 82043 UR ALBUMIN QUANTITATIVE: CPT

## 2020-08-03 PROCEDURE — 80061 LIPID PANEL: CPT

## 2020-08-03 PROCEDURE — 87491 CHLMYD TRACH DNA AMP PROBE: CPT

## 2020-08-09 LAB
C TRACH RRNA SPEC QL NAA+PROBE: NEGATIVE
N GONORRHOEA RRNA SPEC QL NAA+PROBE: NEGATIVE
SPECIMEN SOURCE: NORMAL
T VAGINALIS RRNA VAG QL NAA+PROBE: NEGATIVE

## 2020-08-24 ENCOUNTER — VIRTUAL VISIT (OUTPATIENT)
Dept: INTERNAL MEDICINE CLINIC | Age: 40
End: 2020-08-24

## 2020-08-24 DIAGNOSIS — E66.9 OBESITY, CLASS II, BMI 35-39.9: ICD-10-CM

## 2020-08-24 DIAGNOSIS — I10 ESSENTIAL HYPERTENSION WITH GOAL BLOOD PRESSURE LESS THAN 140/90: Primary | ICD-10-CM

## 2020-08-24 DIAGNOSIS — R36.9 PENILE DISCHARGE: ICD-10-CM

## 2020-08-24 NOTE — PROGRESS NOTES
Pilar Hill is a 42 y.o. male who was seen by synchronous (real-time) audio-video technology on 8/24/2020. Assessment & Plan:   1. Penile D/c: Etiology is unknown. He screens negative for STDs. His urinalysis was unremarkable.  -He declines a referral to Urology at this time. As a result, we will proceed with observation alone. 2.  Hypertension:  I will schedule him for an in office blood pressure check. For now, he can continue with lisinopril as prescribed. I will follow-up with him in March 2021. I encouraged him to continue losing weight. He states that his clothes are looser. We will check his weight in the office during his upcoming nurse visit. Lab review: labs are reviewed in the EHR     I have discussed the diagnosis with the patient and the intended plan as seen in the above orders. I have discussed medication side effects and warnings with the patient as well. I have reviewed the plan of care with the patient, accepted their input and they are in agreement with the treatment goals. All questions were answered. The patient understands the plan of care. Pt instructed if symptoms worsen to call the office or report to the ED for continued care. Greater than 50% of the visit time was spent in counseling and/or coordination of care. Voice recognition was used to generate this report, which may have resulted in some phonetic based errors in grammar and contents. Even though attempts were made to correct all the mistakes, some may have been missed, and remained in the body of the document. Subjective:   Pilar Hill was seen for   Chief Complaint   Patient presents with    Follow-up     The pt is a 44-year-old male with history of hypertension and obesity. 1. HTN:  Taking lisinopril. He does not have a blood pressure machine at home and cannot afford 1. Earlier this year, he reported bilateral lower extremity edema. Edema has resolved.  His CMP is unremarkable for significant abnormalities. +Losing weight as he is exercising on a daily basis. BP Readings from Last 3 Encounters:   09/26/18 138/88   02/14/18 (!) 132/94   08/26/16 (!) 153/103     2. Penile D/c: At his last appointment, he reported a 2-month history of clear to yellow penile discharge. No dysuria or hematuria. No alleviating factors are known. A urinalysis was obtained and negative for any abnormalities. Additionally, he screens negative for hepatitis, HIV, and syphilis. He later tested negative for gonorrhea and chlamydia. Although he stated at his last appointment that symptoms have resolved, he is now stating that they are back. Symptoms appear to be intermittent. No fever, abdominal pain, or pain with intercourse. Prior to Admission medications    Medication Sig Start Date End Date Taking? Authorizing Provider   lisinopriL (PRINIVIL, ZESTRIL) 20 mg tablet Take 1 Tab by mouth daily. 6/29/20   Inga Castro MD     No Known Allergies  Past Medical History:   Diagnosis Date    HTN (hypertension)     Obesity      No past surgical history on file. 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  No HA  CV: No CP  Resp: No cough/SOB  GI: No abdominal pain  : No hematuria/dysuria  Derm: No rash  Neuro: No new paresthesias/weakness  Musc: No new myalgias/jt pain  Psych: No depression sx      Objective:     General: alert, cooperative, no distress   Mental  status: mental status: alert, oriented to person, place, and time, normal mood, behavior, speech, dress, motor activity, and thought processes   Resp: resp: normal effort and no respiratory distress   Neuro: neuro: no gross deficits   Skin: skin: no discoloration or lesions of concern on visible areas     LABS:  Lab Results   Component Value Date/Time    Sodium 143 08/03/2020 08:24 AM    Potassium 4.5 08/03/2020 08:24 AM    Chloride 109 08/03/2020 08:24 AM    CO2 31 08/03/2020 08:24 AM    Anion gap 3 08/03/2020 08:24 AM    Glucose 84 08/03/2020 08:24 AM    BUN 19 (H) 08/03/2020 08:24 AM    Creatinine 1.24 08/03/2020 08:24 AM    BUN/Creatinine ratio 15 08/03/2020 08:24 AM    GFR est AA >60 08/03/2020 08:24 AM    GFR est non-AA >60 08/03/2020 08:24 AM    Calcium 9.0 08/03/2020 08:24 AM       Lab Results   Component Value Date/Time    Cholesterol, total 127 08/03/2020 08:24 AM    HDL Cholesterol 50 08/03/2020 08:24 AM    LDL, calculated 69.4 08/03/2020 08:24 AM    VLDL, calculated 7.6 08/03/2020 08:24 AM    Triglyceride 38 08/03/2020 08:24 AM    CHOL/HDL Ratio 2.5 08/03/2020 08:24 AM       Lab Results   Component Value Date/Time    WBC 6.3 06/15/2020 08:03 AM    HGB 13.1 06/15/2020 08:03 AM    HCT 41.8 06/15/2020 08:03 AM    PLATELET 531 65/07/0400 08:03 AM    MCV 94.1 06/15/2020 08:03 AM       Lab Results   Component Value Date/Time    Hemoglobin A1c 4.6 06/15/2020 08:03 AM       Lab Results   Component Value Date/Time    TSH 2.27 02/14/2018 01:50 PM           Due to this being a TeleHealth  evaluation, many elements of the physical examination are unable to be assessed.      The pt was seen by synchronous (real-time) audio-video technology, and/or her healthcare decision maker, is aware that this patient-initiated, Telehealth encounter is a billable service, with coverage as determined by her insurance carrier. She is aware that she may receive a bill and has provided verbal consent to proceed: Yes. The pt is being evaluated by a video visit encounter for concerns as above. A caregiver was present when appropriate. Due to this being a TeleHealth encounter (During [facility]-58 public health emergency), evaluation of the following organ systems was limited: Vitals/Constitutional/EENT/Resp/CV/GI//MS/Neuro/Skin/Heme-Lymph-Imm. Pursuant to the emergency declaration under the [facility], [facility] waiver authority and the [employer] Act, this Virtual  Visit was conducted, with patient's (and/or legal guardian's) consent, to reduce the patient's risk of exposure to COVID-19 and provide necessary medical care. Services were provided through a video synchronous discussion virtually to substitute for in-person clinic visit. Patient and provider were located at their individual homes. We discussed the expected course, resolution and complications of the diagnosis(es) in detail. Medication risks, benefits, costs, interactions, and alternatives were discussed as indicated. I advised him to contact the office if his condition worsens, changes or fails to improve as anticipated. He expressed understanding with the diagnosis(es) and plan.      Truong Cohen MD

## 2020-08-25 ENCOUNTER — TELEPHONE (OUTPATIENT)
Dept: INTERNAL MEDICINE CLINIC | Age: 40
End: 2020-08-25

## 2020-08-25 NOTE — TELEPHONE ENCOUNTER
----- Message from Yandel Smith MD sent at 8/24/2020 12:47 PM EDT -----  Regarding: F/u apts needed  Please schedule the patient for a 30-minute follow-up appointment with me in March 2021. Please schedule him for a nurse visit for a blood pressure and weight check within the next week.      Thanks,  Dr. Ping [place]  [facility], [address] [place] [address], [address] [place] [address].  Phone: [phone]  Fax: [phone]